# Patient Record
Sex: FEMALE | Race: WHITE
[De-identification: names, ages, dates, MRNs, and addresses within clinical notes are randomized per-mention and may not be internally consistent; named-entity substitution may affect disease eponyms.]

---

## 2017-06-30 ENCOUNTER — HOSPITAL ENCOUNTER (OUTPATIENT)
Dept: HOSPITAL 80 - FSGY | Age: 72
Discharge: HOME | End: 2017-06-30
Attending: SURGERY
Payer: COMMERCIAL

## 2017-06-30 VITALS
TEMPERATURE: 97.5 F | OXYGEN SATURATION: 94 % | RESPIRATION RATE: 16 BRPM | SYSTOLIC BLOOD PRESSURE: 140 MMHG | DIASTOLIC BLOOD PRESSURE: 70 MMHG | HEART RATE: 55 BPM

## 2017-06-30 DIAGNOSIS — Z90.13: ICD-10-CM

## 2017-06-30 DIAGNOSIS — F41.8: ICD-10-CM

## 2017-06-30 DIAGNOSIS — Z92.21: ICD-10-CM

## 2017-06-30 DIAGNOSIS — R22.2: Primary | ICD-10-CM

## 2017-06-30 DIAGNOSIS — Z85.3: ICD-10-CM

## 2017-06-30 DIAGNOSIS — Z80.3: ICD-10-CM

## 2017-06-30 DIAGNOSIS — J45.909: ICD-10-CM

## 2017-06-30 PROCEDURE — 0JB60ZZ EXCISION OF CHEST SUBCUTANEOUS TISSUE AND FASCIA, OPEN APPROACH: ICD-10-PCS | Performed by: SURGERY

## 2017-06-30 NOTE — PDANEPAE
ANE History of Present Illness





here for chest wall ex biopsy





ANE Past Medical History





- Cardiovascular History


Hx Hypertension: No


Hx Arrhythmias: No


Hx Chest Pain: No


Hx Coronary Artery / Peripheral Vascular Disease: No


Hx CHF / Valvular Disease: No


Hx Palpitations: No


Cardiovascular History Comment: Benign heart murmur.





- Pulmonary History


Hx COPD: No


Hx Asthma/Reactive Airway Disease: No


Hx Recent Upper Respiratory Infection: No


Hx Oxygen in Use at Home: No


Hx Sleep Apnea: No


Sleep Apnea Screening Result - Last Documented: Negative


Pulmonary History Comment: seasonal allergies





- Neurologic History


Hx Cerebrovascular Accident: No


Hx Seizures: Yes


Hx Dementia: No


Neurologic History Comment: Temporal lobe epilepsy.





- Endocrine History


Hx Diabetes: No





- Renal History


Hx Renal Disorders: No





- Liver History


Hx Hepatic Disorders: Yes


Hepatic History Comment: Unable to process Tylenol





- Neurological & Psychiatric Hx


Hx Neurological and Psychiatric Disorders: Yes


Neurological / Psychiatric History Comment: anxiety





- Cancer History


Hx Cancer: Yes


Cancer History Comment: BREAST CANCER 2/2016- mastectomy and chemo.  basal cell-

removed





- Congenital Disorder History


Hx Congenital Disorders: Yes


Congenital History Comment: Genetic spinal curvature.





- GI History


Hx Gastrointestinal Disorders: Yes


Gastrointestinal History Comment: Chronic diverticulitis.





- Other Health History


Other Health History: Mild macular degeneration. L eye optic nerve injury. 

Rosecea on face.Osteoarthritis in joints. HX. of uterine fibroids.  wears 

glasses





- Chronic Pain History


Chronic Pain: No





- Surgical History


Prior Surgeries: 02/29/16 bilateral mastectomy with sentinel node with Brian.  

SIGMOID COLON REMOVAL - 01/2015.  2000-L4/5,L5/S1 fusion.  1982-L parotid gland 

removal.





ANE Review of Systems





- Exercise capacity


METS (RN): 4 METS





ANE Patient History





- Allergies


Allergies/Adverse Reactions: 








acetaminophen [From Tylenol] Allergy (Intermediate, Verified 06/29/17 17:05)


 Other-Enter Comments


Shellfish *RETIRED-09/10/12 [Shellfish] Allergy (Intermediate, Verified 09/08/ 12 14:14)


 


strawberry Allergy (Intermediate, Verified 01/28/15 09:19)


 Unknown


NSAIDS (Non-Steroidal Anti-Inflamma Allergy (Mild, Verified 09/08/12 13:07)


 Other-Enter Comments








- Home Medications


Home Medications: 








LORazepam [Ativan (*)]  01/07/15 [Last Taken 06/29/17 20:30]


Pregabalin [Lyrica]  01/07/15 [Last Taken 06/30/17 03:00]


Herbals/Supplements -Info Only  06/29/17 [Last Taken 06/29/17 06:30]


Loratadine  06/29/17 [Last Taken 06/29/17 06:30]








- NPO status


NPO Since - Liquids (Date): 06/29/17


NPO Since - Liquids (Time): 03:00


NPO Since - Solids (Date): 06/29/17


NPO Since - Solids (Time): 18:00





- Smoking Hx


Smoking Status: Never smoked





- Family Anes Hx


Family Hx Anesthesia Complications: NONE





ANE Labs/Vital Signs





- Vital Signs


Blood Pressure: 135/81


Heart Rate: 66


Respiratory Rate: 16


O2 Sat (%): 97


Height: 162.56 cm


Weight: 60.328 kg





ANE Physical Exam





- Airway


Neck exam: FROM


Mallampati Score: Class 1


Mouth exam: normal dental/mouth exam





- Pulmonary


Pulmonary: no respiratory distress





- Cardiovascular


Cardiovascular: regular rate and rhythym





- ASA Status


ASA Status: II





ANE Anesthesia Plan


Anesthesia Plan: MAC

## 2017-06-30 NOTE — POSTOPPROG
Post Op Note


Date of Operation: 06/30/17


Surgeon: Heather Torres


Assistant: vianca


Anesthesiologist: marline


Anesthesia: IV Sedation


Pre-op Diagnosis: excess breast tissue


Post-op Diagnosis: same


Indication: 71 year old s/p bilateral mastectomy with additional tissue by 

sternum.  US


Procedure: excision skin soft tissue


Findings: no unusual


Inf/Abcess present in the surg proc area at time of surgery?: No


EBL: Minimal


Specimen(s): 





chest wall tissue

## 2017-06-30 NOTE — PDHPUP
History & Physical Update


H&P update statement: 


This history and physical update is based on an assessment of the patient which 

was completed after admission or registration (within 24 hours), but prior to 

the surgery/procedure.





H&P update: H&P reviewed & patient examined, no change in patient's condition 

since H&P completed

## 2017-06-30 NOTE — GOP
[f rep st]



                                                                OPERATIVE REPORT





DATE OF OPERATION:  06/29/2017



SURGEON:  Heather Torres MD



ASSISTANT:  Nicole Sanchez, GURU.



ANESTHESIA:  IV sedation.



ANESTHESIOLOGIST:  Alexi Deluna MD.



PREOPERATIVE DIAGNOSIS:  History of breast cancer with additional tissue by sternum.



POSTOPERATIVE DIAGNOSIS:  History of breast cancer with additional tissue by sternum.



PROCEDURE PERFORMED:  Excision skin, soft tissue, at sternum.  Specimen measured approximately 3 x 1
0 x 1 cm.



FINDINGS:  No unusual findings.



ESTIMATED BLOOD LOSS:  5 cc.



INDICATIONS:  The patient is a 71-year-old, status post bilateral mastectomy for breast cancer.  She
 had a fullness by her sternum.  She had an ultrasound performed, which showed residual breast tissu
e.  She presents for excision.



DESCRIPTION OF PROCEDURE:  The patient was brought into the operating room, placed supine on the tab
le.  Monitored anesthesia care with IV sedation was performed.  Her chest was prepped and draped in 
the usual sterile fashion.  I infiltrated the area with 0.5% Marcaine mixed with 1% lidocaine.  I ma
de an ellipse around the excess tissue.  I dissected down until I could see the edge of the pectoral
is on either side.  I excised the skin, soft tissue, and adipose tissue.  Hemostasis was achieved.  
The wound was closed with 3-0 Vicryl, followed by 4-0 Monocryl.  Mastisol, Steri-Strips, sterile guillermo
ssing were applied.  She tolerated the procedure well.





Job #:  [id number]

## 2018-07-13 ENCOUNTER — HOSPITAL ENCOUNTER (OUTPATIENT)
Dept: HOSPITAL 80 - FIMAGING | Age: 73
End: 2018-07-13
Attending: PHYSICAL MEDICINE & REHABILITATION
Payer: COMMERCIAL

## 2018-07-13 DIAGNOSIS — M53.3: Primary | ICD-10-CM

## 2018-07-13 DIAGNOSIS — D25.9: ICD-10-CM
